# Patient Record
Sex: FEMALE | Race: WHITE | NOT HISPANIC OR LATINO | Employment: FULL TIME | URBAN - METROPOLITAN AREA
[De-identification: names, ages, dates, MRNs, and addresses within clinical notes are randomized per-mention and may not be internally consistent; named-entity substitution may affect disease eponyms.]

---

## 2024-06-07 NOTE — TELEPHONE ENCOUNTER
Action 06/07/24 3:47 PM AC   Action Taken  Called [facility] and [facility] for imaging to push to PACS, faxed request to F:[phone]      DIAGNOSIS: infection in right knee cap,self,imaging done @ [facility]    APPOINTMENT DATE: 6/10/24   NOTES STATUS DETAILS   DISCHARGE REPORT from the ER Care Everywhere 5/24/24 - Kay Sifuentes PA-C - [facility] ED   MEDICATION LIST Internal     XRAYS (IMAGES & REPORTS) PACS 5/24/24

## 2024-06-10 ENCOUNTER — PRE VISIT (OUTPATIENT)
Dept: ORTHOPEDICS | Facility: CLINIC | Age: 24
End: 2024-06-10